# Patient Record
(demographics unavailable — no encounter records)

---

## 2025-06-19 NOTE — HISTORY OF PRESENT ILLNESS
[FreeTextEntry1] : YASMINE PRUITT is a 87 year old male presenting with his wife. He has not had a PSA done in many years and his most recent was 16. He also reports weak stream and incontinence but has not had any treatment for these.  PMH: HTN, HLD PSH: Excision of skin cancer Medications: Denied Allergies to medication: Denied Family Hx: Cardiovascular disease in father Occupation: Former   Social: Tobacco denied, ETOH use on occasion.
[FreeTextEntry1] : YASMINE PRUITT is a 87 year old male presenting with his wife. He has not had a PSA done in many years and his most recent was 16. He also reports weak stream and incontinence but has not had any treatment for these.  PMH: HTN, HLD PSH: Excision of skin cancer Medications: Denied Allergies to medication: Denied Family Hx: Cardiovascular disease in father Occupation: Former   Social: Tobacco denied, ETOH use on occasion.
No

## 2025-06-19 NOTE — PHYSICAL EXAM
[Normal Appearance] : normal appearance [Well Groomed] : well groomed [General Appearance - In No Acute Distress] : no acute distress [Edema] : no peripheral edema [Respiration, Rhythm And Depth] : normal respiratory rhythm and effort [Exaggerated Use Of Accessory Muscles For Inspiration] : no accessory muscle use [Abdomen Soft] : soft [Abdomen Tenderness] : non-tender [Costovertebral Angle Tenderness] : no ~M costovertebral angle tenderness [Urinary Bladder Findings] : the bladder was normal on palpation [Normal Station and Gait] : the gait and station were normal for the patient's age [] : no rash [No Focal Deficits] : no focal deficits [Oriented To Time, Place, And Person] : oriented to person, place, and time [Affect] : the affect was normal [Mood] : the mood was normal [No Palpable Adenopathy] : no palpable adenopathy [de-identified] : normal male genitalia. prostate asymmetric, larger on the right and suggestive of prostate cancer

## 2025-06-19 NOTE — END OF VISIT
[FreeTextEntry3] : Recommendation: A prescription was given for Bactrim DS BID for 10 days. An additional prescription was given for Tamsulosin 0.4mg QHS. The patient will follow up with a PSA and free PSA after antibiotic course completion.  He will return to the office in 3-4 weeks for a TRUSP.

## 2025-06-19 NOTE — ADDENDUM
[FreeTextEntry1] : I, Romina Duron, acted as a scribe on behalf of Dr. Norton 06/19/2025.   All medical record entries made by the Scribe were at my, Dr. Norton, direction and personally dictated by me on 06/19/2025. I have reviewed the chart and agree that the record accurately reflects my personal performance of the history, physical exam, assessment, and plan. I have also personally directed, reviewed, and agreed with the chart.

## 2025-06-19 NOTE — ASSESSMENT
[FreeTextEntry1] : Imp: BPH with obstruction, incontinence, and PSA elevation with abnormal prostate on MIAN.

## 2025-06-19 NOTE — PHYSICAL EXAM
[Normal Appearance] : normal appearance [Well Groomed] : well groomed [General Appearance - In No Acute Distress] : no acute distress [Edema] : no peripheral edema [Respiration, Rhythm And Depth] : normal respiratory rhythm and effort [Exaggerated Use Of Accessory Muscles For Inspiration] : no accessory muscle use [Abdomen Soft] : soft [Abdomen Tenderness] : non-tender [Costovertebral Angle Tenderness] : no ~M costovertebral angle tenderness [Urinary Bladder Findings] : the bladder was normal on palpation [Normal Station and Gait] : the gait and station were normal for the patient's age [] : no rash [No Focal Deficits] : no focal deficits [Oriented To Time, Place, And Person] : oriented to person, place, and time [Affect] : the affect was normal [Mood] : the mood was normal [No Palpable Adenopathy] : no palpable adenopathy [de-identified] : normal male genitalia. prostate asymmetric, larger on the right and suggestive of prostate cancer